# Patient Record
(demographics unavailable — no encounter records)

---

## 2024-12-19 NOTE — HISTORY OF PRESENT ILLNESS
[FreeTextEntry6] : since thursday with cough, congestion, runny nose  also with asthma - taking asthma meds as per asthma action plan sick sore throat coughing, with post tussive emesis fever tmax 102f, Acetaminophen prn  - afebrile 48 hours at least  follows dr naidu for asthma  No vomiting, diarrhea, sob or difficulty breathing, joint pain or swelling, rash, urinary symptoms, headaches, ear pain or tugging

## 2024-12-19 NOTE — DISCUSSION/SUMMARY
[FreeTextEntry1] : BROOKLYN is a 12 year M with acute viral syndrome.   Recommend supportive care including antipyretics, fluids, OTC cough/cold medications if age-appropriate, and nasal saline followed by nasal suction. Patient to be brought to the ED if has persistent decreased oral intake, decrease in wet diapers, fever >100.4F or becomes patient becomes lethargic or changed in mental status and alertness. To note if fever > 5 days must be seen immediately either in clinic or in ED.  In order to maintain hydration consume "oral rehydration solution," such as Pedialyte or low calorie sports drinks. If vomiting, try to give child a few teaspoons of fluid every few minutes. Avoid drinking juice or soda. These can make diarrhea worse. If tolerating solids, its best to consume lean meats, fruits, vegetables, and whole-grain breads and cereals. Avoid eating foods with a lot of fat or sugar, which can make symptoms worse.  Caretaker expressed understanding of the plan and agrees. No other concerns or questions today.

## 2024-12-19 NOTE — PHYSICAL EXAM
[NL] : clear to auscultation bilaterally [Wheezing] : no wheezing [FreeTextEntry4] : nasal congestion